# Patient Record
Sex: MALE | Race: BLACK OR AFRICAN AMERICAN | Employment: UNEMPLOYED | URBAN - METROPOLITAN AREA
[De-identification: names, ages, dates, MRNs, and addresses within clinical notes are randomized per-mention and may not be internally consistent; named-entity substitution may affect disease eponyms.]

---

## 2024-01-22 ENCOUNTER — OFFICE VISIT (OUTPATIENT)
Age: 59
End: 2024-01-22
Payer: COMMERCIAL

## 2024-01-22 VITALS
HEIGHT: 71 IN | HEART RATE: 98 BPM | OXYGEN SATURATION: 97 % | RESPIRATION RATE: 20 BRPM | SYSTOLIC BLOOD PRESSURE: 111 MMHG | WEIGHT: 220 LBS | TEMPERATURE: 97.8 F | DIASTOLIC BLOOD PRESSURE: 72 MMHG | BODY MASS INDEX: 30.8 KG/M2

## 2024-01-22 DIAGNOSIS — A53.9 SYPHILIS: Primary | ICD-10-CM

## 2024-01-22 PROCEDURE — 99203 OFFICE O/P NEW LOW 30 MIN: CPT | Performed by: INTERNAL MEDICINE

## 2024-01-22 RX ORDER — HALOPERIDOL DECANOATE 50 MG/ML
2.5 INJECTION INTRAMUSCULAR ONCE
COMMUNITY

## 2024-01-22 RX ORDER — DIPHENHYDRAMINE HYDROCHLORIDE 50 MG/ML
25 INJECTION INTRAMUSCULAR; INTRAVENOUS EVERY 6 HOURS PRN
COMMUNITY

## 2024-01-22 RX ORDER — HALOPERIDOL 2 MG/1
2 TABLET ORAL 4 TIMES DAILY
COMMUNITY

## 2024-01-22 RX ORDER — RISPERIDONE 3 MG/1
3 TABLET ORAL 2 TIMES DAILY
COMMUNITY

## 2024-01-22 RX ORDER — DIVALPROEX SODIUM 125 MG/1
750 TABLET, DELAYED RELEASE ORAL 3 TIMES DAILY
COMMUNITY

## 2024-01-22 RX ORDER — DIVALPROEX SODIUM 250 MG/1
250 TABLET, DELAYED RELEASE ORAL 3 TIMES DAILY
COMMUNITY

## 2024-01-22 RX ORDER — DIPHENHYDRAMINE HCL 50 MG
50 CAPSULE ORAL EVERY 6 HOURS PRN
COMMUNITY

## 2024-01-22 RX ORDER — AMMONIUM LACTATE 12 G/100G
CREAM TOPICAL PRN
COMMUNITY

## 2024-01-22 RX ORDER — LISINOPRIL 5 MG/1
5 TABLET ORAL DAILY
COMMUNITY

## 2024-01-22 RX ORDER — HYDROXYZINE HYDROCHLORIDE 25 MG/1
25 TABLET, FILM COATED ORAL 3 TIMES DAILY PRN
COMMUNITY

## 2024-01-22 RX ORDER — ACETAMINOPHEN 650 MG/1
650 SUPPOSITORY RECTAL EVERY 4 HOURS PRN
COMMUNITY

## 2024-01-22 RX ORDER — ATORVASTATIN CALCIUM 20 MG/1
20 TABLET, FILM COATED ORAL DAILY
COMMUNITY

## 2024-01-22 RX ORDER — DIVALPROEX SODIUM 250 MG/1
250 TABLET, EXTENDED RELEASE ORAL DAILY
COMMUNITY

## 2024-01-22 NOTE — PROGRESS NOTES
Chief Complaint   Patient presents with    New Patient    Syphilis     /72 (Site: Left Upper Arm, Position: Sitting, Cuff Size: Large Adult)   Pulse 98   Temp 97.8 °F (36.6 °C) (Oral)   Resp 20   Ht 1.803 m (5' 11\")   Wt 99.8 kg (220 lb)   SpO2 97%   BMI 30.68 kg/m²     
  TempSrc: Oral   SpO2: 97%   Weight: 99.8 kg (220 lb)   Height: 1.803 m (5' 11\")       Objective  Physical Exam  Vitals and nursing note reviewed. Exam conducted with a chaperone present (Aide).   Constitutional:       Appearance: He is not ill-appearing.   HENT:      Head: Normocephalic and atraumatic.      Right Ear: External ear normal.      Left Ear: External ear normal.      Nose: Nose normal.      Mouth/Throat:      Mouth: Mucous membranes are dry.   Eyes:      Extraocular Movements: Extraocular movements intact.      Pupils: Pupils are equal, round, and reactive to light.   Cardiovascular:      Rate and Rhythm: Normal rate.      Heart sounds: No murmur heard.  Pulmonary:      Effort: Pulmonary effort is normal.      Breath sounds: Normal breath sounds.   Abdominal:      General: Bowel sounds are normal. There is no distension.      Palpations: Abdomen is soft.   Musculoskeletal:      Cervical back: Neck supple.      Right lower leg: No edema.      Left lower leg: No edema.   Skin:     Findings: No rash.      Comments: Tatoo right flank   Neurological:      General: No focal deficit present.      Mental Status: He is alert. He is disoriented.      Cranial Nerves: No cranial nerve deficit.      Sensory: No sensory deficit.      Motor: No weakness.      Coordination: Coordination normal.      Gait: Gait normal.      Comments: Romberg negative   Psychiatric:         Behavior: Behavior normal.     LAB:  (12/20/2023)    RPR Reactive  RPR Titer  1:4  T. Pallidum Antibody  Reactive  HIV Ab/p24 Ag screen Non reactive        Assessment & Plan    Presumptive latent syphilis of unknown duration, status post Bicillin 2.4 million units IM weekly x 3 weeks  Psychiatric disorder on Depakote and Risperidone    Comment:  Latent syphilis appears to be a reasonable diagnosis in this case, however, it should be recognized that neurosyphilis can cause cognitive and psychiatric disturbances and would not be adequately treated by

## 2024-10-01 ENCOUNTER — OFFICE VISIT (OUTPATIENT)
Age: 59
End: 2024-10-01
Payer: COMMERCIAL

## 2024-10-01 VITALS
TEMPERATURE: 98.4 F | WEIGHT: 216.6 LBS | HEART RATE: 96 BPM | SYSTOLIC BLOOD PRESSURE: 128 MMHG | RESPIRATION RATE: 16 BRPM | HEIGHT: 71 IN | DIASTOLIC BLOOD PRESSURE: 88 MMHG | OXYGEN SATURATION: 96 % | BODY MASS INDEX: 30.32 KG/M2

## 2024-10-01 DIAGNOSIS — A53.9 SYPHILIS: Primary | ICD-10-CM

## 2024-10-01 DIAGNOSIS — A53.9 SYPHILIS: ICD-10-CM

## 2024-10-01 PROCEDURE — 99214 OFFICE O/P EST MOD 30 MIN: CPT | Performed by: INTERNAL MEDICINE

## 2024-10-01 RX ORDER — ARIPIPRAZOLE 30 MG/1
30 TABLET ORAL DAILY
COMMUNITY

## 2024-10-01 RX ORDER — ROSUVASTATIN CALCIUM 5 MG/1
5 TABLET, COATED ORAL DAILY
COMMUNITY

## 2024-10-01 RX ORDER — BENZTROPINE MESYLATE 0.5 MG/1
0.5 TABLET ORAL NIGHTLY
COMMUNITY

## 2024-10-01 RX ORDER — CELECOXIB 100 MG/1
100 CAPSULE ORAL 2 TIMES DAILY
COMMUNITY

## 2024-10-01 RX ORDER — CARVEDILOL 6.25 MG/1
6.25 TABLET ORAL 2 TIMES DAILY WITH MEALS
COMMUNITY

## 2024-10-01 NOTE — PROGRESS NOTES
Subjective    Marquise Guillory is a 59 y.o. male    HPI   Patient with psychiatric disorder referred from [facility] in 1/2024 for evaluation for syphilis.   Lab results indicated RPR reactive at 1:4 with reactive T. Pallidum antibodies.  Medication list indicated that he had received Bicillin 2.4 million units weekly x 3 weeks for latent syphilis.  HIV antibody screening and HCV screening were negative.   Patient was alert and responsive, however, he was unable to provide any history and did not understand what syphilis was.  He offered no symptoms at that time. No further treatment was recommended, however, I requested repeat RPR in 6 months with follow-up here.   He returns today for follow-up.  He is again alert but confused.  No specific symptoms except for upset stomach.      Review of Systems   Eyes:  Negative for visual disturbance.   Musculoskeletal:  Negative for arthralgias.   Skin:  Negative for rash.   Allergic/Immunologic: Negative for immunocompromised state.   Neurological:  Positive for numbness (left hand). Negative for dizziness, weakness, light-headedness and headaches.   Reliability questioned      History reviewed. No pertinent past medical history.     History reviewed. No pertinent surgical history.     Vitals:    10/01/24 1639   BP: 128/88   Site: Right Upper Arm   Position: Sitting   Cuff Size: Large Adult   Pulse: 96   Resp: 16   Temp: 98.4 °F (36.9 °C)   TempSrc: Temporal   SpO2: 96%   Weight: 98.2 kg (216 lb 9.6 oz)   Height: 1.803 m (5' 11\")       Objective  Physical Exam  Vitals and nursing note reviewed. Exam conducted with a chaperone present (Two Aides present).   Constitutional:       Appearance: He is not ill-appearing.      Comments: Disheveled, unkempt in appearance   HENT:      Head: Normocephalic and atraumatic.      Right Ear: External ear normal.      Left Ear: External ear normal.      Nose: Nose normal.      Mouth/Throat:      Pharynx: Oropharynx is clear.   Eyes:

## 2024-10-01 NOTE — PROGRESS NOTES
\"Have you been to the ER, urgent care clinic since your last visit?  Hospitalized since your last visit?\"    NO    “Have you seen or consulted any other health care providers outside our system since your last visit?”    NO      “Have you had a colorectal cancer screening such as a colonoscopy/FIT/Cologuard?    NO    No colonoscopy on file  No cologuard on file  No FIT/FOBT on file   No flexible sigmoidoscopy on file            Chief Complaint   Patient presents with    Follow-up     /88 (Site: Right Upper Arm, Position: Sitting, Cuff Size: Large Adult)   Pulse 96   Temp 98.4 °F (36.9 °C) (Temporal)   Resp 16   Ht 1.803 m (5' 11\")   Wt 98.2 kg (216 lb 9.6 oz)   SpO2 96%   BMI 30.21 kg/m²